# Patient Record
Sex: MALE | Race: WHITE | NOT HISPANIC OR LATINO | Employment: FULL TIME | URBAN - NONMETROPOLITAN AREA
[De-identification: names, ages, dates, MRNs, and addresses within clinical notes are randomized per-mention and may not be internally consistent; named-entity substitution may affect disease eponyms.]

---

## 2019-03-14 ENCOUNTER — TRANSCRIBE ORDERS (OUTPATIENT)
Dept: ADMINISTRATIVE | Facility: HOSPITAL | Age: 34
End: 2019-03-14

## 2019-03-14 DIAGNOSIS — E78.5 HYPERLIPIDEMIA, UNSPECIFIED HYPERLIPIDEMIA TYPE: ICD-10-CM

## 2019-03-14 DIAGNOSIS — I10 HYPERTENSION, UNSPECIFIED TYPE: ICD-10-CM

## 2019-03-14 DIAGNOSIS — F10.20 ACUTE ALCOHOLISM (HCC): Primary | ICD-10-CM

## 2019-03-14 DIAGNOSIS — Z00.00 WELLNESS EXAMINATION: ICD-10-CM

## 2019-03-15 ENCOUNTER — APPOINTMENT (OUTPATIENT)
Dept: LAB | Facility: HOSPITAL | Age: 34
End: 2019-03-15

## 2019-03-15 ENCOUNTER — TRANSCRIBE ORDERS (OUTPATIENT)
Dept: ADMINISTRATIVE | Facility: HOSPITAL | Age: 34
End: 2019-03-15

## 2019-03-15 DIAGNOSIS — E78.5 HYPERLIPIDEMIA, UNSPECIFIED HYPERLIPIDEMIA TYPE: Primary | ICD-10-CM

## 2019-03-15 DIAGNOSIS — F10.20 ACUTE ALCOHOLISM (HCC): ICD-10-CM

## 2019-03-15 DIAGNOSIS — I10 HYPERTENSION, UNSPECIFIED TYPE: ICD-10-CM

## 2019-03-15 DIAGNOSIS — Z00.00 ROUTINE GENERAL MEDICAL EXAMINATION AT A HEALTH CARE FACILITY: ICD-10-CM

## 2019-03-15 LAB
ALBUMIN SERPL-MCNC: 4.5 G/DL (ref 3.5–5)
ALBUMIN/GLOB SERPL: 1.5 G/DL (ref 1.1–2.5)
ALP SERPL-CCNC: 59 U/L (ref 24–120)
ALT SERPL W P-5'-P-CCNC: 21 U/L (ref 0–54)
ANION GAP SERPL CALCULATED.3IONS-SCNC: 9 MMOL/L (ref 4–13)
ARTICHOKE IGE QN: 84 MG/DL (ref 0–99)
AST SERPL-CCNC: 31 U/L (ref 7–45)
BILIRUB SERPL-MCNC: 0.6 MG/DL (ref 0.1–1)
BUN BLD-MCNC: 4 MG/DL (ref 5–21)
BUN/CREAT SERPL: 5.9 (ref 7–25)
CALCIUM SPEC-SCNC: 9.8 MG/DL (ref 8.4–10.4)
CHLORIDE SERPL-SCNC: 102 MMOL/L (ref 98–110)
CHOLEST SERPL-MCNC: 135 MG/DL (ref 130–200)
CO2 SERPL-SCNC: 31 MMOL/L (ref 24–31)
CREAT BLD-MCNC: 0.68 MG/DL (ref 0.5–1.4)
DEPRECATED RDW RBC AUTO: 40.8 FL (ref 40–54)
ERYTHROCYTE [DISTWIDTH] IN BLOOD BY AUTOMATED COUNT: 12.5 % (ref 12–15)
FOLATE SERPL-MCNC: 6.81 NG/ML (ref 4.78–24.2)
GFR SERPL CREATININE-BSD FRML MDRD: 133 ML/MIN/1.73
GLOBULIN UR ELPH-MCNC: 3 GM/DL
GLUCOSE BLD-MCNC: 95 MG/DL (ref 70–100)
HCT VFR BLD AUTO: 47.4 % (ref 40–52)
HDLC SERPL-MCNC: 35 MG/DL
HGB BLD-MCNC: 16.6 G/DL (ref 14–18)
LDLC/HDLC SERPL: 2.04 {RATIO}
MCH RBC QN AUTO: 31.2 PG (ref 28–32)
MCHC RBC AUTO-ENTMCNC: 35 G/DL (ref 33–36)
MCV RBC AUTO: 89.1 FL (ref 82–95)
PLATELET # BLD AUTO: 306 10*3/MM3 (ref 130–400)
PMV BLD AUTO: 8.9 FL (ref 6–12)
POTASSIUM BLD-SCNC: 4.4 MMOL/L (ref 3.5–5.3)
PROT SERPL-MCNC: 7.5 G/DL (ref 6.3–8.7)
RBC # BLD AUTO: 5.32 10*6/MM3 (ref 4.8–5.9)
SODIUM BLD-SCNC: 142 MMOL/L (ref 135–145)
TRIGL SERPL-MCNC: 143 MG/DL (ref 0–149)
VIT B12 BLD-MCNC: 324 PG/ML (ref 239–931)
WBC NRBC COR # BLD: 9.24 10*3/MM3 (ref 4.8–10.8)

## 2019-03-15 PROCEDURE — 36415 COLL VENOUS BLD VENIPUNCTURE: CPT | Performed by: NURSE PRACTITIONER

## 2019-03-15 PROCEDURE — 82607 VITAMIN B-12: CPT | Performed by: NURSE PRACTITIONER

## 2019-03-15 PROCEDURE — 85027 COMPLETE CBC AUTOMATED: CPT | Performed by: NURSE PRACTITIONER

## 2019-03-15 PROCEDURE — 82746 ASSAY OF FOLIC ACID SERUM: CPT | Performed by: NURSE PRACTITIONER

## 2019-03-15 PROCEDURE — 80061 LIPID PANEL: CPT | Performed by: NURSE PRACTITIONER

## 2019-03-15 PROCEDURE — 80053 COMPREHEN METABOLIC PANEL: CPT | Performed by: NURSE PRACTITIONER

## 2019-03-26 LAB — REF LAB TEST RESULTS: NORMAL

## 2022-08-09 ENCOUNTER — OFFICE VISIT (OUTPATIENT)
Dept: SURGERY | Facility: CLINIC | Age: 37
End: 2022-08-09

## 2022-08-09 VITALS
DIASTOLIC BLOOD PRESSURE: 72 MMHG | HEIGHT: 68 IN | WEIGHT: 167 LBS | HEART RATE: 92 BPM | BODY MASS INDEX: 25.31 KG/M2 | RESPIRATION RATE: 20 BRPM | SYSTOLIC BLOOD PRESSURE: 128 MMHG | TEMPERATURE: 96 F | OXYGEN SATURATION: 99 %

## 2022-08-09 DIAGNOSIS — Z30.09 UNWANTED FERTILITY: Primary | ICD-10-CM

## 2022-08-09 PROCEDURE — 99212 OFFICE O/P EST SF 10 MIN: CPT | Performed by: SPECIALIST

## 2022-08-09 RX ORDER — LISINOPRIL 20 MG/1
20 TABLET ORAL DAILY
COMMUNITY

## 2022-08-09 RX ORDER — OMEPRAZOLE 20 MG/1
20 CAPSULE, DELAYED RELEASE ORAL DAILY
COMMUNITY

## 2022-08-09 RX ORDER — HYDROCODONE BITARTRATE AND ACETAMINOPHEN 5; 325 MG/1; MG/1
1 TABLET ORAL EVERY 4 HOURS PRN
Qty: 15 TABLET | Refills: 0 | Status: SHIPPED | OUTPATIENT
Start: 2022-08-09 | End: 2022-08-24

## 2022-08-09 RX ORDER — DIAZEPAM 5 MG/1
5 TABLET ORAL DAILY
Qty: 3 TABLET | Refills: 0 | Status: SHIPPED | OUTPATIENT
Start: 2022-08-09 | End: 2022-08-24

## 2022-08-09 NOTE — PROGRESS NOTES
Patient: Raul Martines    YOB: 1985    Date: 08/09/2022    Primary Care Provider: Otto Santana MD    Chief Complaint   Patient presents with   • Sterilization       Subjective .     History of present illness:  Mr. Martines is a 37 y.o. who is here for evaluation of undesired fertility.  He has 1 son that is 5 years old who was born prematurely he has some degree of cerebral palsy, he does not want to go through that again he is happy with the size of his family and desires bilateral vasectomy and treatment of undesired fertility.  He is aware of the procedure the risk and benefits and with full knowledge of this and apparent understanding he gives his informed consent for bilateral vasectomy to be completed he desires this to be accomplished on the 19 August and proceed given for Valium a total of 15 to be given 15 minutes before he shows up and also for Lortab 5 to be used for pain medicine post procedure.    Past surgical history TNA    Medical history significant for hypertension and gastritis.  Allergies negative    Smokes 1 pack/day for 25 years, occasional alcohol,    Review of systems positive for visual changes, elevated blood pressure, heartburn indigestion.  All other systems were reviewed and all other systems are negative after review.  .    The following portions of the patient's history were reviewed and updated as appropriate: allergies, current medications, past family history, past medical history, past social history, past surgical history and problem list.    Review of Systems    History:  History reviewed. No pertinent past medical history.     History reviewed. No pertinent surgical history.    History reviewed. No pertinent family history.         Allergies:  No Known Allergies    Medications:     Current Outpatient Medications:   •  lisinopril (PRINIVIL,ZESTRIL) 20 MG tablet, Take 20 mg by mouth Daily., Disp: , Rfl:   •  omeprazole (priLOSEC) 20 MG capsule, Take 20 mg by mouth  "Daily., Disp: , Rfl:   •  diazePAM (Valium) 5 MG tablet, Take 1 tablet by mouth Daily. Please take 3 Valium at 745 on 19 August in preparation for vasectomy., Disp: 3 tablet, Rfl: 0  •  HYDROcodone-acetaminophen (NORCO) 5-325 MG per tablet, Take 1 tablet by mouth Every 4 (Four) Hours As Needed for Mild Pain  for up to 15 doses. Patient for vasectomy on 19 August Lortab 5 and also Valium for vasectomy procedure on the 19th., Disp: 15 tablet, Rfl: 0    Objective     Vital Signs:   Vitals:    08/09/22 1551   BP: 128/72   BP Location: Right arm   Patient Position: Sitting   Cuff Size: Adult   Pulse: 92   Resp: 20   Temp: 96 °F (35.6 °C)   SpO2: 99%   Weight: 75.8 kg (167 lb)   Height: 172.7 cm (68\")       Physical Exam:     General Appearance:    Alert, cooperative, in no acute distress   Head:    Normocephalic, without obvious abnormality, atraumatic   Eyes:            Lids and lashes normal, conjunctivae and sclerae normal, no   icterus, no pallor, corneas clear,   Ears:    Ears appear intact with no abnormalities noted   Throat:   No oral lesions, no thrush, oral mucosa moist       Lungs:     Clear to auscultation,respirations regular, even and                  Unlabored    Heart:    Regular rhythm and normal rate, no murmur, no gallop.   Chest Wall:    No abnormalities observed   Abdomen:     Normal bowel sounds, no masses, no organomegaly, soft        non-tender, non-distended, no guarding.    Testicles both descended and nontender, unremarkable penis, testicles both descended and nontender, the right vas is slightly enlarged, no history of any epididymitis.  No hernia on the right or left side.  The vasa is not tender there is no warmth.   Extremities:   Moves all extremities well, no edema, no cyanosis, no             redness   Pulses:   Pulses palpable and equal bilaterally   Skin:   No bleeding, bruising or rash   Lymph nodes:   No palpable adenopathy   Neurologic:   Cranial nerves 2 - 12 grossly intact.     "     Results Review:   I reviewed the patient's new clinical results.    Review of Systems was reviewed and confirmed as accurate as documented by the MA.    BMI is >= 25 and <30. (Overweight) The following options were offered after discussion;: weight loss educational material (shared in after visit summary)  Assessment / Plan:    Diagnoses and all orders for this visit:    1. Unwanted fertility (Primary)  -     diazePAM (Valium) 5 MG tablet; Take 1 tablet by mouth Daily. Please take 3 Valium at 745 on 19 August in preparation for vasectomy.  Dispense: 3 tablet; Refill: 0  -     HYDROcodone-acetaminophen (NORCO) 5-325 MG per tablet; Take 1 tablet by mouth Every 4 (Four) Hours As Needed for Mild Pain  for up to 15 doses. Patient for vasectomy on 19 August Lortab 5 and also Valium for vasectomy procedure on the 19th.  Dispense: 15 tablet; Refill: 0     Mr. Martines is a 37 y.o. who is here for evaluation of undesired fertility.  He has 1 son that is 5 years old who was born prematurely he has some degree of cerebral palsy, he does not want to go through that again he is happy with the size of his family and desires bilateral vasectomy and treatment of undesired fertility.  He is aware of the procedure the risk and benefits and with full knowledge of this and apparent understanding he gives his informed consent for bilateral vasectomy to be completed he desires this to be accomplished on the 19 August and proceed given for Valium a total of 15 to be given 15 minutes before he shows up and also for Lortab 5 to be used for pain medicine post procedure.          Electronically signed by David Chan MD  08/09/22  16:55 CDT

## 2022-08-09 NOTE — PATIENT INSTRUCTIONS
Plan for vasectomy here on 19 August, show up by 10 till 8 on the 19th, shave the area from the base of the penis down on the morning of the 19th of the night of the 18th, go to the pharmacy and get the 2 different prescriptions 1 is for Valium 5mg take 3 at 745 on that Friday, and the other medicine is pain medicine to take after the procedure.  Bring a jockstrap or some compression pants with you when you come so we can place those on after the procedure.  We will look forward to seeing you on the 19th sorry I was late today.

## 2022-08-19 ENCOUNTER — PROCEDURE VISIT (OUTPATIENT)
Dept: SURGERY | Facility: CLINIC | Age: 37
End: 2022-08-19

## 2022-08-19 DIAGNOSIS — Z98.52 STATUS POST VASECTOMY: Primary | ICD-10-CM

## 2022-08-19 DIAGNOSIS — G89.18 POSTOPERATIVE ABDOMINAL PAIN: ICD-10-CM

## 2022-08-19 DIAGNOSIS — R10.9 POSTOPERATIVE ABDOMINAL PAIN: ICD-10-CM

## 2022-08-19 DIAGNOSIS — Z30.09 UNWANTED FERTILITY: ICD-10-CM

## 2022-08-19 PROCEDURE — 88302 TISSUE EXAM BY PATHOLOGIST: CPT | Performed by: SPECIALIST

## 2022-08-19 RX ORDER — HYDROCODONE BITARTRATE AND ACETAMINOPHEN 7.5; 325 MG/1; MG/1
1 TABLET ORAL EVERY 4 HOURS PRN
Qty: 15 TABLET | Refills: 0 | Status: SHIPPED | OUTPATIENT
Start: 2022-08-19 | End: 2022-08-24

## 2022-08-19 NOTE — PROGRESS NOTES
Procedure Note    Raul Martines      Pre-op Diagnosis: Undesired fertility   Post-op Diagnosis:     Undesired fertility    Procedure/CPT® Codes:          Bilateral vasectomy      Anesthesia: Xylocaine with epinephrine, 15 mg of Valium p.o.      Staff:   Portion of right and left vas sent separately    Estimated Blood Loss: 10 cc    Specimens:                Portion of right and left vas      Drains no drains    Indications: Mr. Martines is a 37 y.o. who is here for evaluation of undesired fertility.  He has 1 son that is 5 years old who was born prematurely he has some degree of cerebral palsy, he does not want to go through that again he is happy with the size of his family and desires bilateral vasectomy and treatment of undesired fertility.  He is aware of the procedure the risk and benefits and with full knowledge of this and apparent understanding he gives his informed consent for bilateral vasectomy to be completed he desires this to be accomplished on the 19 August and proceed given for Valium a total of 15 to be given 15 minutes before he shows up and also for Lortab 5 to be used for pain medicine post procedure.    Findings: Unremarkable vasectomy completed without difficulty blood loss less than 10 cc    Complications: No complications    Procedure: Mr. Martines was placed in a supine position the surgical suite the scrotum and penis was scrubbed prepped and draped with Hibiclens and Betadine.  Once this was completed it was toweled off a timeout was completed and the surgery was begun.  Initially the vas was noted on the left local anesthesia of 1% Xylocaine with epinephrine infiltrated and local was injected.  A towel clamp was then placed around the vas and incision was completed over this vas and dissecting down to the vas was accomplished.  The vasa was denuded of the surrounding tissue and subsequently grasped proximally and distally.  It was ligated with the ends cauterized and further tied with 3-0  Prolene suture.  Once completed and full hemostasis accomplished the deep dermis was reapproximated using 2 simple inverted interrupted sutures of 3-0 Vicryl.  With the left side completed the same process was completed on the right side once again a portion of the vas was removed and sent for pathologic evaluation and demarcated right and left side.  Once completed on the right side direct pressure was held for 5 minutes he was then prepared discharged to home to follow-up with me in 5 days.  He will have a semen analysis completed in 2 months at the earliest and he will use some type of protection in the interim for birth control.  David Chan MD     Date: 8/19/2022  Time: 15:04 CDT    EMR Dragon/Transcription disclaimer: Much of this encounter note is an electronic transcription/translation of spoken language to printed text. The electronic translation of spoken language may permit erroneous, or at times, nonsensical words or phrases to be inadvertently transcribed; although I have reviewed the note for such errors, some may still exist.

## 2022-08-23 LAB
CYTO UR: NORMAL
LAB AP CASE REPORT: NORMAL
Lab: NORMAL
PATH REPORT.FINAL DX SPEC: NORMAL
PATH REPORT.GROSS SPEC: NORMAL

## 2022-08-24 ENCOUNTER — OFFICE VISIT (OUTPATIENT)
Dept: SURGERY | Facility: CLINIC | Age: 37
End: 2022-08-24

## 2022-08-24 VITALS
SYSTOLIC BLOOD PRESSURE: 122 MMHG | HEIGHT: 68 IN | WEIGHT: 167 LBS | BODY MASS INDEX: 25.31 KG/M2 | DIASTOLIC BLOOD PRESSURE: 80 MMHG

## 2022-08-24 DIAGNOSIS — Z30.09 UNWANTED FERTILITY: Primary | ICD-10-CM

## 2022-08-24 PROCEDURE — 99024 POSTOP FOLLOW-UP VISIT: CPT | Performed by: SPECIALIST

## 2022-08-24 NOTE — PATIENT INSTRUCTIONS
Thank you for letting me take care of your problem, we will check a semen analysis at the earliest of 2 months it could be later than that let us know when you turn this in so we can get the results back to you, looks like you are doing well from your vasectomy continue as you are doing follow-up with me as needed.  Take Advil Aleve Motrin for any discomfort in the scrotal region.

## 2024-01-16 NOTE — PROGRESS NOTES
"Office Established Patient Note:     Referring Provider: Dr. Otto Santana    Chief complaint undesired fertility    Subjective .     History of present illness:  Raul Martines is a 37 y.o. male who is currently 4 days out from bilateral vasectomy and treatment of undesired fertility.  He is here for wound check and follow-up to assure no problems.  He is increasing his activity resuming all activities without discomfort,.    History  History reviewed. No pertinent past medical history.,   Past Surgical History:   Procedure Laterality Date   • TONSILLECTOMY AND ADENOIDECTOMY     • VASECTOMY     ,   Family History   Problem Relation Age of Onset   • Hypertension Mother    • Hypertension Father    ,   Social History     Tobacco Use   • Smoking status: Current Every Day Smoker     Packs/day: 0.50     Years: 15.00     Pack years: 7.50     Types: Cigarettes   • Smokeless tobacco: Current User   Vaping Use   • Vaping Use: Never used   Substance Use Topics   • Alcohol use: Yes     Alcohol/week: 5.0 standard drinks     Types: 5 Cans of beer per week   • Drug use: Never   , (Not in a hospital admission)   and Allergies:  Patient has no known allergies.    Current Outpatient Medications:   •  lisinopril (PRINIVIL,ZESTRIL) 20 MG tablet, Take 20 mg by mouth Daily., Disp: , Rfl:   •  omeprazole (priLOSEC) 20 MG capsule, Take 20 mg by mouth Daily., Disp: , Rfl:     Objective     Vital Signs   /80 (BP Location: Left arm, Patient Position: Sitting, Cuff Size: Adult)   Ht 172.7 cm (67.99\")   Wt 75.8 kg (167 lb)   BMI 25.40 kg/m²      Physical Exam:  Right and left testicles normal, no seroma no hematoma, nicely healing wound right and left scrotum, pathology shows unremarkable portion of right and left vas deferens.    Currently he will be discharged, he will have a semen analysis completed in 2 months at the earliest, we will call him on these results.  Pathology is unremarkable.    Results Review:  Result Review :  Lab " "Results   Component Value Date    FINALDX  08/19/2022     1.  Left vas deferens, vasectomy:  Completely transected vas deferens.    2.  Right vas deferens, vasectomy:  Completely transected vas deferens.      GROSSDES  08/19/2022     1. Vas Deferens, Left.   Received in a formalin filled container labeled with the patient's name, date of birth, and \"left vas deferens\".  The specimen consists of a segment of vas deferens measuring 0.8 cm in length by 0.3 cm in diameter.  The external surface is yellow-gray and unremarkable.  The external surface is inked black.  The specimen is totally submitted, intact, in block 1A for histology to cut and embed      2. Vas Deferens, Right.   Received in a formalin filled container labeled with the patient's name, date of birth, and \"right vas deferens\".  The specimen consists of a segment of vas deferens measuring 0.7 cm in length by 0.3 cm in diameter.  The external surface is gray-white and unremarkable.  The external surface is inked blue.  The specimen is totally submitted, intact, in block 2A for histology to cut and embed               Assessment & Plan       Diagnoses and all orders for this visit:    1. Unwanted fertility (Primary)  -     Semen Analysis, Post-vasectomy - Semen, Voided; Future       Semen analysis to be completed in 2 months, will call on these results, he will be discharged to follow-up with me as needed.    Discussed BMI elevation and need to move toward a reduced BMI for health concerns he appears to understand he is a smoker and his meds were reviewed.      David Chan MD  08/24/22  09:10 CDT            " Electrophysiology